# Patient Record
Sex: MALE | Race: WHITE
[De-identification: names, ages, dates, MRNs, and addresses within clinical notes are randomized per-mention and may not be internally consistent; named-entity substitution may affect disease eponyms.]

---

## 2020-03-08 ENCOUNTER — HOSPITAL ENCOUNTER (EMERGENCY)
Dept: HOSPITAL 92 - ERS | Age: 50
Discharge: HOME | End: 2020-03-08
Payer: SELF-PAY

## 2020-03-08 DIAGNOSIS — F10.129: ICD-10-CM
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
## 2020-03-08 DIAGNOSIS — Z23: ICD-10-CM

## 2020-03-08 DIAGNOSIS — S01.111A: Primary | ICD-10-CM

## 2020-03-08 PROCEDURE — 72125 CT NECK SPINE W/O DYE: CPT

## 2020-03-08 PROCEDURE — L0120 CERV FLEX N/ADJ FOAM PRE OTS: HCPCS

## 2020-03-08 PROCEDURE — 12013 RPR F/E/E/N/L/M 2.6-5.0 CM: CPT

## 2020-03-08 PROCEDURE — 90471 IMMUNIZATION ADMIN: CPT

## 2020-03-08 PROCEDURE — 90715 TDAP VACCINE 7 YRS/> IM: CPT

## 2020-03-08 PROCEDURE — 70450 CT HEAD/BRAIN W/O DYE: CPT
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
